# Patient Record
Sex: FEMALE | Race: WHITE
[De-identification: names, ages, dates, MRNs, and addresses within clinical notes are randomized per-mention and may not be internally consistent; named-entity substitution may affect disease eponyms.]

---

## 2018-04-03 ENCOUNTER — HOSPITAL ENCOUNTER (OUTPATIENT)
Dept: HOSPITAL 45 - C.NEUR | Age: 38
Discharge: HOME | End: 2018-04-03
Attending: HOSPITALIST
Payer: COMMERCIAL

## 2018-04-03 DIAGNOSIS — G47.33: Primary | ICD-10-CM

## 2018-04-04 NOTE — PAP/PSG TECHNICIAN REPORT
[facility]

Technician Polysomnogram Report



Study name:

None

Report date:

4/4/2018



Study date:

4/3/2018

Referring Physician:

Dr. Jaylin Saunders M.D.



Name:

JOSÉ LUIS TOVAR 

Interpreting Physician:

José Melo M.D.



YOB: 1980

Technician:

Kalie Hummel [facility].



Sex:

Female







Age:

37

Study Type:

PSG



Weight:

262 lbs





16 in



Height:

37 years, Height 5' 7"

Neck Circum:







BMI:

41.03







Medications:

ATORVASTATIN 40 MG, ACONAZOLE NITRATE 1% CREAM, PROZAC 20 MG, FUROSEMIDE 40 MG, LISINOPRIL 10 MG, 
METOPROLOL 50 MG, NICODERM CQ 14 MG/24 HR, WARFARIN 5 MG















Patient History





37 yr-old female here for a baseline/modified split study (to introduce CPAP if AHI exceeds 15).  
She has a history of snoring, witnessed apneas, and daytime sleepiness.  Her [place] La[place] scale is 11.

The test was started on room air.

ETCO2 testing is included in this study.

Room 1







Parameters Monitored

NPSG: E1-M2, E2-M1, Fp1-M2, Fp2-M1, F3-M2, F4-M2, F4-M1, C3-M2, C4-M2, C4-M1, O1-M2, O2-M2,

O2-M1, T3-M2, T4-M1, P3-M2, P4-M1, CHIN1, CHIN2, HR, EKG, Legs, PFLOW, SNOR, FLOW, CFLOW,

Tidal Volume, THOR, ABDO, SpO2, PLTH, CPRESS, ETCO2 Wave, ETCO2, pH





Sleep Architecture



Sleep Stages



Time at Lights Off

10:16:54 PM



STAGES

Time (min.)

TST (%)



Time at Lights On

5:19:54 AM



Wake

191.5

--



Total Recording Time (TRT)

423.00 min.



N1

62.5

27



Total Sleep Period (TSP)

330.0 min.



N2

104.5

45



Total Sleep Time (TST)

231.5min.



N3

35.0

15



Awake Time

191.5 min.



REM

29.5

13



Wake after Sleep Onset

121.5 min.











Sleep Efficiency (SE)

55 %











Sleep Onset Latency (SOL)

70.0 min.











Number of Stage 1 Shifts

None











Awakenings

19











Stage Changes

71











Number of REM periods

4



REM

29.5

13



REM Latency

117.5 min.



NREM

202.0

87





Body Position Analysis





Supine

Right

Left

Side

Prone

Vertical



Total Sleep Time (min.)

131.5

38.0

107.5

145.50

36.5

0.0



Total Sleep Time (%)

29%

16%

46%

63

8%

N/A%



Total Sleep Time REM (min.)

8.5

0.0

21.0

None

0.0

0.0



Total Sleep Time NREM (min.)

58.0

38.0

86.5

None

19.5

0.0



Intermittent Wake (min.)

65.0

27.3

82.2

None

17.0

0.0



Total Sleep Period (%)

27%

None





Arousals







Myoclonus (PLM) *







Events

Count

Index



Events

Count

Index



Spontaneous

15

4



Events Awake (PLMW)

253

79.3



Respiratory

32

8.3



Events Asleep w/ Arousal (PLMA)

29

7.5



PLM

29

8



Events Asleep w/o Arousal (PLMS)

296

76.7



Snoring

10

3



Total Asleep

325

84.2



Total

86

22



Total

578

82







Respiratory Analysis *





CA

OA

MA

CH

H

RERA

Total



Count

3

14

4

0

48

6

69



Index

0.8

3.6

1.0

0

12.4

2

19.4



Mean Duration

17.9

20.2

23.5

0.00

18.0

17.1

18.6



Longest Duration

22.4

44.6

32.2

0.00

32.2

20.3

44.6







Respiratory Event Summary 







Total

Supine

~Supine

Right

Left

Prone

REM

NREM



Apneas

Count

21

20

1

0

1

0

3

18





Index

5.4

18

0

0.0

0.6

0

6

5



Hypopneas (4% Desat)

Count

48

37

11

0

9

2

10

38





Index

12.4

33.4

4

0.0

5.0

6.2

20.3

11.3



Apneas & All Hypopneas 

Count

69

57

12

0

10

2

13

56





Index

17.9

51

4

0

6

6

26.4

16.6



Respiratory Events 

(Apn+All Hyp+RERA)

Count

69

60

15

0

13

2

13

56





Index

19.4

54

5

0.0

7.3

6.2

26.4

18.4



Respiratory Related Arousal

Count

32

60

4

0

4

0

7

25





Index

8.3

25

1

0

2

0

14

7





Snoring Analysis





Supine

Right

Left

Prone

REM

NREM

Total



Snore duration

32.1 min



Snores count

293

185

543

174

67

1,128

1,195



Snore mean duration

1.6 Sec



Snores index

264

292

303

535

136.3

335.0

309.7



TST with snoring (%)

13.9%





SpO2 Analysis



Total

REM

NREM

Awake



<50%

0.0 min.



51 - 60%

0.0 min.



61 - 70%

0.0 min.



71 - 80%

1.6 min.

1.6 min.

0.0 min.

0.0 min.



81 - 90%

131.4 min.

23.1 min.

83.6 min.

24.7 min.



91 - 100%

278.7 min.

4.7 min.

118.4 min.

155.5 min.



Average

91

87

91

92



Minimum SpO2

74

74

84

84



Desaturation Event Index

14.8

36.6

18.4

8.1



# Desat. Events below 89%

46

17

24

5



Time(%) with Saturation below 89%

7.5

4.6

2.8

0.2



Time(min.) with Saturation below 89%

31.1

18.8

11.6

0.7







Heart Rate Analysis



End Tidal CO2 Analysis





Min (bpm)

Max (bpm)

Average (bpm)





TSP (mins)

% of TSP



Awake

53

127

80



Above 55 mmHg

0.0

0.0



NREM

52

95

78



50-55 mmHg

0.0

0.0



REM

50

100

76



45-50 mmHg

0.0

0.0



Overall

50

100

78



40-45 mmHg

6.0

2.6













35-40 mmHg

48.1

20.8













30-35 mmHg

11.4

4.9































Average ETCO2

0.0





Supplemental O2 Values



Minimum O2 level: None



Value  

Start Time

End Time





Technician Comments





Ms. Tovar slept in the right, left, supine and prone positions.  Cardiac arrhythmias were noted 
(please refer to the printouts).  PLMs were noted.  No bruxism noted.  Snoring was noted and scored 
as a 2 on a scale of 1 through 5. (0=no snoring, 5=snoring loud enough to be heard through a closed 
door or down the vieyra way)  She did not meet specific Split-Night criteria during the diagnostic 
portion of this study.  She awoke to use the restroom two times during the night.  Ms. Tovar stated 
that she slept poorly.

The final report will be interpreted and signed by a sleep physician. The completed physician report 
will then be placed in the patient medical record.









 



 



 



 



 



 

 



Therapy (cm H2O)

0



TIB (min.)

423.0



TST (min.)

231.5



Sleep Onset (min.)

70.0



REM Onset From Sleep (min.)

117.5



Sleep Efficiency %

55



Wakefulness (%)

45



Wakefulness (min.)

191.5



NREM 1 (%)

27



NREM 1 (min.)

62.5



NREM 2 (%)

45



NREM 2 (min.)

104.5



NREM 3 (%)

15



NREM 3 (min.)

35.0







REM (%)

13



REM (min.)

29.5



# Arousals

86



Arousal Index

22



# Snore

1,195



Snore Index

309.7



AHI

17.9



AHI Supine

51



AHI Non-Supine

4



NREM AHI

16.6



REM AHI

26.4



RDI

19.4



# Obstructive Apnea

14



# Central Apnea

3



# Mixed Apnea

4







# Hypopneas

48



RERAs

6



Total Respiratory Events

76



Time Below SpO2 89% (min.)

30.4



Mean NREM SpO2 (%)

91



Mean REM SpO2 (%)

87



Mean Sleep SpO2 (%)

90



Min NREM SpO2 (%)

84



Min REM SpO2 (%)

74



Position Supine (min.)

131.5



Position Non-supine (min.)

165.0



LM Index Sleep

84.2



LM Index NREM

90.0



LM Index REM

44.7







Mean Heart Rate (bpm)

78



Min Heart Rate (bpm)

50

## 2018-04-05 NOTE — POLYSOMNOGRAPH REPORT
CLINICAL DATA:  A 37-year-old female with BMI of 41 referred by Dr. Saunders

for a possible split night study with a history of snoring, witnessed apnea,

and daytime sleepiness.  Her [place] sleepiness score is 11/24.

 

SLEEP ARCHITECTURE:  Total sleep period was 330 minutes.  Total sleep time

was 231.5 minutes divided between 202 minutes of non-REM sleep and 29.5

minutes of REM sleep.  Sleep latency was delayed at 70 minutes.  REM latency

was 117.5 minutes.  Sleep efficiency was reduced to 55%.  Wake after sleep

onset was elevated at 121.5 minutes.  Sleep consisted of stage N1 27%, stage

N2 45%, stage N3 15%, and REM 13%.

 

AROUSAL DATA:  86 arousals were recorded for an index of 22 per hour.  32

were due to respiratory events.  10 were due to snoring events.

 

PLM DATA:  Severe elevation of limb movements during sleep were noted.  There

were 325 limb movements during sleep noted for an index of 84.2 per hour with

arousal index of 7.5 per hour.

 

RESPIRATORY DATA:  Moderate sleep apnea was documented.  The AHI was 17.9. 

The RDI was 19.4.  There were 3 central, 14 obstructive, and 4 mixed apneic

episodes.  The longest apneic episode was 44.6 seconds.  There were 48

hypopneic episodes.  The longest duration of hypopnea was 32.2 seconds. 

There were 6 RERAs.  The longest RERA was 20.3 seconds.

 

OXIMETRY DATA:  Nocturnal hypoxemia was seen.  Oxygen bg was 74% during

REM.  Mean saturation was 91%.  Time below 89% was 31 minutes.

 

EKG:  Heart ranged from  beats per minute.  Occasional PACs were noted.

 

TECHNICIAN'S COMMENTS:  The patient slept in the right, left, supine, and

prone positions.  PLMs were noted throughout the night.  Snoring was mild,

rated 2 on a scale of 1-5.  The patient did not meet split night criteria

early enough during the study to initiate CPAP.

 

IMPRESSION:  Moderate sleep apnea/hypopnea with an AHI of 17.9 and an RDI of

19.4 with nocturnal hypoxemia. The patient also had significant PLMD.

 

RECOMMENDATIONS:  The patient may benefit from a repeat sleep study with

CPAP, use of auto CPAP, or use of an oral appliance.  Clinical correlation is

needed.

 

 

 

JERRY

## 2018-07-18 ENCOUNTER — HOSPITAL ENCOUNTER (OUTPATIENT)
Dept: HOSPITAL 45 - C.NEUR | Age: 38
Discharge: HOME | End: 2018-07-18
Attending: HOSPITALIST
Payer: COMMERCIAL

## 2018-07-18 DIAGNOSIS — G47.33: Primary | ICD-10-CM

## 2018-07-18 DIAGNOSIS — I10: ICD-10-CM

## 2018-07-19 NOTE — PAP/PSG TECHNICIAN REPORT
[facility]

Technician Polysomnogram Report



Study name:

None

Report date:

7/19/2018



Study date:

7/18/2018

Referring Physician:

Dr. Jaylin Saunders M.D.



Name:

JOSÉ LUIS TOVAR 

Interpreting Physician:

Jaylin Saunders M.D.



YOB: 1980

Technician:

AFTAB Echols.



Sex:

Female







Age:

38

StudyType:

PSG PAP



Weight:

260 lbs









Height:

38 years, Height 5' 7"

Neck Circum:16 inches







BMI:

40.72







Medications:

Lipitor 40mg, Prozac 20mg, Lasix 40mg, Prinivil 10mg, Lopressor 50MEQ, Warfarin 5mgNicoderm CQ 















Patient History





Study started on room air with 4cwp cpap in room #8. 38 yr old female here tonight for a new 
titration study. She has a history of HTN, anxiety, atrial flutter and LILIA. PSG showed moderate LILIA. 
Her ESS=10. Neck circ=16inches.







Parameters Monitored

NPSG: E1-M2, E2-M1, Fp1-M2, Fp2-M1, F3-M2, F4-M2, F4-M1, C3-M2, C4-M2, C4-M1, O1-M2, O2-M2,

O2-M1, T3-M2, T4-M1, P3-M2, P4-M1, CHIN1, CHIN2, HR, EKG, Legs, PFLOW, SNOR, FLOW, CFLOW,

Tidal Volume, THOR, ABDO, SpO2, PLTH, CPRESS, ETCO2 Wave, ETCO2, pH





Sleep Architecture



Sleep Stages



Time at Lights Off

9:52:47 PM



STAGES

Time (min.)

TST (%)



Time at Lights On

5:42:47 AM



Wake

154.5

--



Total Recording Time (TRT)

470.00 min.



N1

7.5

2



Total Sleep Period (TSP)

418.5 min.



N2

158.0

50



Total Sleep Time (TST)

315.5min.



N3

86.5

27



Awake Time

154.5 min.



REM

63.5

20



Wake after Sleep Onset

103.0 min.











Sleep Efficiency (SE)

67 %











Sleep Onset Latency (SOL)

51.5 min.











Number of Stage 1 Shifts

None











Awakenings

20











Stage Changes

55











Number of REM periods

1



REM

63.5

20



REM Latency

355.0 min.



NREM

252.0

80





Body Position Analysis





Supine

Right

Left

Side

Prone

Vertical



Total Sleep Time (min.)

211.9

176.0

0.0

176.00

0.0

0.0



Total Sleep Time (%)

44%

56%

0%

56

0%

N/A%



Total Sleep Time REM (min.)

0.0

63.5

0.0

None

0.0

0.0



Total Sleep Time NREM (min.)

139.5

112.5

0.0

None

0.0

0.0



Intermittent Wake (min.)

72.4

44.3

37.8

None

0.0

0.0



Total Sleep Period (%)

46%

None





Arousals







Myoclonus (PLM) *







Events

Count

Index



Events

Count

Index



Spontaneous

8

2



Events Awake (PLMW)

158

61.4



Respiratory

5

1.0



Events Asleep w/ Arousal (PLMA)

10

1.9



PLM

7

2



Events Asleep w/o Arousal (PLMS)

130

24.7



Snoring

3

1



Total Asleep

140

26.6



Total

23

4



Total

298

38







Respiratory Analysis *





CA

OA

MA

CH

H

RERA

Total



Count

3

3

1

0

5

1

12



Index

0.6

0.6

0.2

0

1.0

0

2.5



Mean Duration

17.1

15.7

18.1

0.00

28.6

18.6

21.4



Longest Duration

17.5

19.2

18.1

0.00

18.1

18.6

41.3







Respiratory Event Summary 







Total

Supine

~Supine

Right

Left

Prone

REM

NREM



Apneas

Count

7

3

4

4

N/A

N/A

0

7





Index

1.3

1

1

1.4

N/A

N/A

0

2



Hypopneas (4% Desat)

Count

5

4

1

1

N/A

N/A

0

5





Index

1.0

1.7

0

0.3

N/A

N/A

0.0

1.2



Apneas & All Hypopneas 

Count

12

7

5

5

N/A

N/A

0

12





Index

2.3

3

2

2

N/A

N/A

0.0

2.9



Respiratory Events 

(Apn+All Hyp+RERA)

Count

12

8

5

5

N/A

N/A

0

12





Index

2.5

3

2

1.7

N/A

N/A

0.0

3.1



Respiratory Related Arousal

Count

5

8

2

2

N/A

N/A

0

5





Index

1.0

1

N/A

N/A

0

1





Snoring Analysis





Supine

Right

Left

Prone

REM

NREM

Total



Snore duration

15.3 min



Snores count

717

144

N/A

N/A

6

855

861



Snore mean duration

1.1 Sec



Snores index

308

49

N/A

N/A

5.7

203.6

163.7



TST with snoring (%)

4.9%







Desaturation Event Summary:



Minimum %SpO2

Event Count

Mean/Min/Max Duration(sec.)

Desaturation Index

% Time In Bed



> 90

16

36.4 / 12.0 / 59.5

5.2

41.0



86 - 90

10

34.9 / 12.5 / 60.0

2.5

53.8



81 - 85

1

19.5 / 19.5 / 19.5

2.6

5.1



76 - 80

0

N/A

0.0

0.0



71 - 75

0

N/A

0.0

0.0



66 - 70

0

N/A

0.0

0.0



61 - 65

0

N/A

0.0

0.0



56 - 60

0

N/A

0.0

0.0



51 - 55

0

N/A

0.0

0.0



< 50

0

N/A

0.0

0.0









Total

REM

NREM

Awake



<50%

0.0 min.



51 - 60%

0.0 min.



61 - 70%

0.0 min.



71 - 80%

0.0 min.



81 - 90%

267.1 min.

2.2 min.

206.4 min.

58.4 min.



91 - 100%

185.9 min.

61.3 min.

44.6 min.

80.0 min.



Average

90

92

88

91



Minimum SpO2

80

89

83

80



Desaturation Event Index

2.6

0.0

2.1

4.3



# Desat. Events below 89%

14

N/A

8

6



Time(%) with Saturation below 89%

34.6

0.0

29.8

4.8



Time(min.) with Saturation below 89%

156.5

0.0

134.9

21.6









Time (mins)

REM (mins)

NREM (mins)

% of TST



SpO2 Below 90%

9

N/A

N9

57.1



SpO2 Below 88%

4

0

0

24





Heart Rate Analysis









Min (bpm)

Max (bpm)

Average (bpm)



Awake

49

132

60



NREM

48

127

56



REM

48

73

57



Overall

48

127

56













Supplemental O2 Values



Minimum O2 level: None



Value  

Start Time

End Time





Technician Comments





Ms. Tovar slept in the right, left and supine positions.  Cardiac arrhythmia and PLM's noted, 
please see print outs. No bruxism noted.  CPAP was initiated at +4 CMH2O and up-titrated to a level 
of +12 CMH2O. Most increases in pressure were needed to prevent loud snoring. One liter of oxygen 
was added at 5:07am. The AHI on 12cwp was 3.0 for a total of 123.7 minutes. Her oxygen saturation 
was below 89% for a total of 63.3 minutes at this setting.  A small Simplus full face mask by [employer] 
[employer] was used during titration. She awoke to use the restroom 3 times during the night. She 
stated that she slept about the same as usual. 

The final report will be interpreted and signed by a sleep physician. The completed physician report 
will then be placed in the patient medical record.







 



 



 



 



 



 



 



 

 



Therapy Event:



Therapy (cm H20)

4

6

7

8

10

12



Total Time at Pressure (min.)

66.3

48.2

8.5

28.5

157.8

160.7



TST at Pressure (min.)

8.8

18.2

8.5

26.0

98.3

155.7



# Periods

1



Sleep Onset (min.)

51.5

0.0



REM Onset (min.)

N/A

97.2



Sleep Efficiency %

13

37

100

91

62

96



Wakefulness (%)

86.7

62.2

0.0

8.8

37.7

3.1



Wakefulness (min.)

57.5

30.0

0.0

2.5

59.5

5.0



NREM 1 (%)

6.0

5.2

0.0

0.0

0.6

0.0



NREM 1 (min.)

4.0

2.5

0.0

0.0

1.0

0.0



NREM 2 (%)

7.2

32.6

100.0

24.4

38.9

37.8



NREM 2 (min.)

4.8

15.7

8.5

7.0

61.3

60.7



NREM 3 (%)

0.0

66.8

22.8

19.6



NREM 3 (min.)

0.0

19.0

36.0

31.5



REM (%)

0.0

39.5



REM (min.)

0.0

63.5



# Arousals

2

3

0

1

6

11



Arousal Index

13.6

9.9

0.0

2.3

3.7

4.2



# Snore

37

58

153

308

175

130



Snore Index

252.1

191.2

1,076.1

710.6

106.9

50.1



AHI

6.8

3.3

0.0

2.3

2.4

1.9



AHI Supine

N/A

0.0

0.0

2.3

7.9

3.1



AHI Non-Supine

6.8

11.6

N/A

N/A

1.4

0.8



NREM AHI

6.8

3.3

0.0

2.3

2.4

3.3



REM AHI

N/A

0.0



RDI

6.8

3.3

0.0

2.3

2.4

2.3



# Obstructive

1

1

0

1



# Central Ap

0

3

0



# Mixed

0

1



# Hypopneas

0

1

1

3



RERAS

0

1



Total Respiratory Events

1

1

0

1

4

6



Time Below SpO2 89.00% (min.)

6.0

3.9

0.0

0.5

61.3

63.3



Mean NREM SpO2 (%)

89

91

90

91

88

88



Mean REM SpO2 (%)

N/A

92



Mean Sleep SpO2 (%)

89

91

90

91

88

90



Min NREM SpO2 (%)

86

86

89

87

83

85



Min REM SpO2 (%)

N/A

89



Position Supine (min.)

0.0

13.0

8.5

26.0

15.3

76.7



Position Non-supine (min.)

8.8

5.2

0.0

0.0

83.0

79.0



LM Index Sleep

68.1

39.6

84.4

48.4

25.0

17.0



LM Index NREM

68.1

39.6

84.4

48.4

25.0

16.9



LM Index REM

N/A

17.0



Mean Heart Rate (bpm)

62

57

54

55

57

56



Min Heart Rate (bpm)

54

51

52

51

48

48